# Patient Record
Sex: FEMALE | Race: WHITE
[De-identification: names, ages, dates, MRNs, and addresses within clinical notes are randomized per-mention and may not be internally consistent; named-entity substitution may affect disease eponyms.]

---

## 2017-02-02 ENCOUNTER — HOSPITAL ENCOUNTER (OUTPATIENT)
Dept: HOSPITAL 53 - M OPP | Age: 52
Discharge: HOME | End: 2017-02-02
Attending: SURGERY
Payer: COMMERCIAL

## 2017-02-02 VITALS — WEIGHT: 200 LBS | BODY MASS INDEX: 35.44 KG/M2 | HEIGHT: 63 IN

## 2017-02-02 VITALS — DIASTOLIC BLOOD PRESSURE: 67 MMHG | SYSTOLIC BLOOD PRESSURE: 104 MMHG

## 2017-02-02 DIAGNOSIS — Z12.11: Primary | ICD-10-CM

## 2017-02-02 DIAGNOSIS — Z91.013: ICD-10-CM

## 2017-02-02 DIAGNOSIS — Z79.899: ICD-10-CM

## 2017-02-02 DIAGNOSIS — E07.9: ICD-10-CM

## 2017-02-02 PROCEDURE — 99156 MOD SED OTH PHYS/QHP 5/>YRS: CPT

## 2017-02-02 NOTE — [facility]
________________________________________________________________________________

Patient Name: Elvi Whelan             Procedure Date: 2/2/2017 8:29 AM

MRN: [id number]                          Account Number: [id number]

YOB: 1965               Age: 51

Room: [facility]                            Gender: Female

Note Status: Finalized                 

________________________________________________________________________________

 

Procedure:           Colonoscopy

Indications:         Screening for colorectal malignant neoplasm

Providers:           Leo J. Gosselin Jr, MD

Referring MD:        Tonie REAL DO

Requesting Provider: 

Medicines:           Propofol per Anesthesia

Complications:       No immediate complications.

________________________________________________________________________________

Procedure:           Pre-Anesthesia Assessment:

                     - Prior to the procedure, a History and Physical was 

                     performed, and patient medications and allergies were 

                     reviewed. The patient is competent. The risks and 

                     benefits of the procedure and the sedation options and 

                     risks were discussed with the patient. All questions were 

                     answered and informed consent was obtained. Patient 

                     identification and proposed procedure were verified by 

                     the physician and the nurse in the pre-procedure area and 

                     in the procedure room. Mental Status Examination: alert 

                     and oriented. Airway Examination: normal oropharyngeal 

                     airway and neck mobility. Respiratory Examination: clear 

                     to auscultation. CV Examination: normal. ASA Grade 

                     Assessment: II - A patient with mild systemic disease. 

                     After reviewing the risks and benefits, the patient was 

                     deemed in satisfactory condition to undergo the 

                     procedure. The anesthesia plan was to use moderate 

                     sedation / analgesia (conscious sedation). Immediately 

                     prior to administration of medications, the patient was 

                     re-assessed for adequacy to receive sedatives. The heart 

                     rate, respiratory rate, oxygen saturations, blood 

                     pressure, adequacy of pulmonary ventilation, and response 

                     to care were monitored throughout the procedure. The 

                     physical status of the patient was re-assessed after the 

                     procedure.

                     The Colonoscope was introduced through the anus and 

                     advanced to the cecum, identified by appendiceal orifice 

                     and ileocecal valve. The colonoscopy was performed 

                     without difficulty. The patient tolerated the procedure 

                     well. The quality of the bowel preparation was adequate 

                     and good.

                                                                                

Findings:

     The perianal and digital rectal examinations were normal. Pertinent 

     negatives include normal sphincter tone, no palpable rectal lesions and 

     no anal lesion or abnormality was detected.

     The rectum, recto-sigmoid colon, sigmoid colon, descending colon, 

     transverse colon, ascending colon, cecum, appendiceal orifice and 

     ileocecal valve appeared normal.

                                                                                

Impression:          - The rectum, recto-sigmoid colon, sigmoid colon, 

                     descending colon, transverse colon, ascending colon, 

                     cecum, appendiceal orifice and ileocecal valve are normal.

                     - No specimens collected.

Recommendation:      - Discharge patient to home (ambulatory).

                     - Repeat colonoscopy in 10 years for screening purposes.

                                                                                

 

Leo Gosselin MD

_____________________

Leo J Gosselin Jr, MD

2/2/2017 8:46:26 AM

This report has been signed electronically.

Number of Addenda: 0

 

Note Initiated On: 2/2/2017 8:29 AM

Estimated Blood Loss:

     Estimated blood loss: none.

## 2017-05-23 ENCOUNTER — HOSPITAL ENCOUNTER (OUTPATIENT)
Dept: HOSPITAL 53 - M LAB | Age: 52
End: 2017-05-23
Attending: FAMILY MEDICINE
Payer: COMMERCIAL

## 2017-05-23 DIAGNOSIS — Z13.29: ICD-10-CM

## 2017-05-23 DIAGNOSIS — E03.9: Primary | ICD-10-CM

## 2017-05-23 LAB
ALBUMIN SERPL BCG-MCNC: 3.5 GM/DL (ref 3.2–5.2)
ALBUMIN/GLOB SERPL: 1.03 {RATIO} (ref 1–1.93)
ALP SERPL-CCNC: 71 U/L (ref 45–117)
ALT SERPL W P-5'-P-CCNC: 26 U/L (ref 12–78)
ANION GAP SERPL CALC-SCNC: 6 MEQ/L (ref 8–16)
AST SERPL-CCNC: 14 U/L (ref 15–37)
BASOPHILS # BLD AUTO: 0 K/MM3 (ref 0–0.2)
BASOPHILS NFR BLD AUTO: 0.4 % (ref 0–1)
BILIRUB SERPL-MCNC: 0.9 MG/DL (ref 0.2–1)
BUN SERPL-MCNC: 13 MG/DL (ref 7–18)
CALCIUM SERPL-MCNC: 8.1 MG/DL (ref 8.5–10.1)
CHLORIDE SERPL-SCNC: 105 MEQ/L (ref 98–107)
CHOLEST SERPL-MCNC: 203 MG/DL (ref ?–200)
CO2 SERPL-SCNC: 28 MEQ/L (ref 21–32)
CREAT SERPL-MCNC: 1.01 MG/DL (ref 0.55–1.02)
EOSINOPHIL # BLD AUTO: 0.1 K/MM3 (ref 0–0.5)
EOSINOPHIL NFR BLD AUTO: 1 % (ref 0–3)
ERYTHROCYTE [DISTWIDTH] IN BLOOD BY AUTOMATED COUNT: 12.2 % (ref 11.5–14.5)
GFR SERPL CREATININE-BSD FRML MDRD: > 60 ML/MIN/{1.73_M2} (ref 51–?)
GLUCOSE SERPL-MCNC: 92 MG/DL (ref 70–105)
LARGE UNSTAINED CELL #: 0.1 K/MM3 (ref 0–0.4)
LARGE UNSTAINED CELL %: 1.4 % (ref 0–4)
LYMPHOCYTES # BLD AUTO: 1.9 K/MM3 (ref 1.5–4.5)
LYMPHOCYTES NFR BLD AUTO: 25.7 % (ref 24–44)
MCH RBC QN AUTO: 33 PG (ref 27–33)
MCHC RBC AUTO-ENTMCNC: 35 G/DL (ref 32–36.5)
MCV RBC AUTO: 94.3 FL (ref 80–96)
MONOCYTES # BLD AUTO: 0.4 K/MM3 (ref 0–0.8)
MONOCYTES NFR BLD AUTO: 5 % (ref 0–5)
NEUTROPHILS # BLD AUTO: 4.7 K/MM3 (ref 1.8–7.7)
NEUTROPHILS NFR BLD AUTO: 66.6 % (ref 36–66)
PLATELET # BLD AUTO: 249 K/MM3 (ref 150–450)
POTASSIUM SERPL-SCNC: 4.3 MEQ/L (ref 3.5–5.1)
PROT SERPL-MCNC: 6.9 GM/DL (ref 6.4–8.2)
SODIUM SERPL-SCNC: 139 MEQ/L (ref 136–145)
T4 FREE SERPL-MCNC: 1.18 NG/DL (ref 0.76–1.46)
TRIGL SERPL-MCNC: 173 MG/DL (ref ?–150)
WBC # BLD AUTO: 7.1 K/MM3 (ref 4–10)

## 2018-05-25 ENCOUNTER — HOSPITAL ENCOUNTER (OUTPATIENT)
Dept: HOSPITAL 53 - M LAB | Age: 53
End: 2018-05-25
Attending: FAMILY MEDICINE
Payer: COMMERCIAL

## 2018-05-25 DIAGNOSIS — Z13.220: Primary | ICD-10-CM

## 2018-05-25 LAB
ALBUMIN/GLOBULIN RATIO: 1 (ref 1–1.93)
ALBUMIN: 3.6 GM/DL (ref 3.2–5.2)
ALKALINE PHOSPHATASE: 74 U/L (ref 45–117)
ALT SERPL W P-5'-P-CCNC: 32 U/L (ref 12–78)
ANION GAP: 8 MEQ/L (ref 8–16)
AST SERPL-CCNC: 21 U/L (ref 7–37)
BASO #: 0 10^3/UL (ref 0–0.2)
BASO %: 0.5 % (ref 0–1)
BILIRUBIN,TOTAL: 0.8 MG/DL (ref 0.2–1)
BLOOD UREA NITROGEN: 12 MG/DL (ref 7–18)
CALCIUM LEVEL: 8.4 MG/DL (ref 8.5–10.1)
CARBON DIOXIDE LEVEL: 27 MEQ/L (ref 21–32)
CHLORIDE LEVEL: 106 MEQ/L (ref 98–107)
CHOLEST SERPL-MCNC: 199 MG/DL (ref ?–200)
CHOLESTEROL RISK RATIO: 5.24 (ref ?–5)
CREATININE FOR GFR: 0.94 MG/DL (ref 0.55–1.3)
EOS #: 0.1 10^3/UL (ref 0–0.5)
EOSINOPHIL NFR BLD AUTO: 1.5 % (ref 0–3)
FREE T4: 1.1 NG/DL (ref 0.76–1.46)
GFR SERPL CREATININE-BSD FRML MDRD: > 60 ML/MIN/{1.73_M2} (ref 51–?)
GLUCOSE, FASTING: 89 MG/DL (ref 70–100)
HDLC SERPL-MCNC: 38 MG/DL (ref 40–?)
HEMATOCRIT: 42.5 % (ref 36–47)
HEMOGLOBIN: 14.8 G/DL (ref 12–15.5)
IMMATURE GRANULOCYTE %: 0.4 % (ref 0–3)
LDL CHOLESTEROL: 119 MG/DL (ref ?–100)
LYMPH #: 1.7 10^3/UL (ref 1.5–4.5)
LYMPH %: 21.4 % (ref 24–44)
MEAN CORPUSCULAR HEMOGLOBIN: 32.4 PG (ref 27–33)
MEAN CORPUSCULAR HGB CONC: 34.8 G/DL (ref 32–36.5)
MEAN CORPUSCULAR VOLUME: 93 FL (ref 80–96)
MONO #: 0.6 10^3/UL (ref 0–0.8)
MONO %: 7.8 % (ref 0–5)
NEUTROPHILS #: 5.4 10^3/UL (ref 1.8–7.7)
NEUTROPHILS %: 68.4 % (ref 36–66)
NONHDLC SERPL-MCNC: 161 MG/DL
NRBC BLD AUTO-RTO: 0 % (ref 0–0)
PLATELET COUNT, AUTOMATED: 266 10^3/UL (ref 150–450)
POTASSIUM SERUM: 4.5 MEQ/L (ref 3.5–5.1)
RED BLOOD COUNT: 4.57 10^6/UL (ref 4–5.4)
RED CELL DISTRIBUTION WIDTH: 12.2 % (ref 11.5–14.5)
SODIUM LEVEL: 141 MEQ/L (ref 136–145)
THYROID STIMULATING HORMONE: 4.14 UIU/ML (ref 0.36–3.74)
TOTAL PROTEIN: 7.2 GM/DL (ref 6.4–8.2)
TRIGLYCERIDES LEVEL: 210 MG/DL (ref ?–150)
WHITE BLOOD COUNT: 7.9 10^3/UL (ref 4–10)

## 2018-07-11 ENCOUNTER — HOSPITAL ENCOUNTER (OUTPATIENT)
Dept: HOSPITAL 53 - M LAB | Age: 53
End: 2018-07-11
Attending: FAMILY MEDICINE
Payer: COMMERCIAL

## 2018-07-11 DIAGNOSIS — E03.9: Primary | ICD-10-CM

## 2018-07-11 LAB
FREE T4: 1.05 NG/DL (ref 0.76–1.46)
THYROID STIMULATING HORMONE: 3.87 UIU/ML (ref 0.36–3.74)

## 2018-07-11 PROCEDURE — 84443 ASSAY THYROID STIM HORMONE: CPT

## 2018-08-22 ENCOUNTER — HOSPITAL ENCOUNTER (OUTPATIENT)
Dept: HOSPITAL 53 - M LAB | Age: 53
End: 2018-08-22
Attending: FAMILY MEDICINE
Payer: COMMERCIAL

## 2018-08-22 DIAGNOSIS — E03.9: Primary | ICD-10-CM

## 2018-08-22 LAB
FREE T4: 1.04 NG/DL (ref 0.76–1.46)
THYROID STIMULATING HORMONE: 2.09 UIU/ML (ref 0.36–3.74)

## 2018-08-22 PROCEDURE — 84443 ASSAY THYROID STIM HORMONE: CPT

## 2019-02-22 ENCOUNTER — HOSPITAL ENCOUNTER (OUTPATIENT)
Dept: HOSPITAL 53 - M LAB | Age: 54
End: 2019-02-22
Attending: FAMILY MEDICINE
Payer: COMMERCIAL

## 2019-02-22 DIAGNOSIS — E03.9: Primary | ICD-10-CM

## 2019-02-22 LAB
T4 FREE SERPL-MCNC: 1.11 NG/DL (ref 0.76–1.46)
TSH SERPL DL<=0.005 MIU/L-ACNC: 2.79 UIU/ML (ref 0.36–3.74)